# Patient Record
Sex: FEMALE | Race: BLACK OR AFRICAN AMERICAN
[De-identification: names, ages, dates, MRNs, and addresses within clinical notes are randomized per-mention and may not be internally consistent; named-entity substitution may affect disease eponyms.]

---

## 2019-06-08 ENCOUNTER — HOSPITAL ENCOUNTER (EMERGENCY)
Dept: HOSPITAL 63 - ER | Age: 29
Discharge: HOME | End: 2019-06-08
Payer: SELF-PAY

## 2019-06-08 VITALS — HEIGHT: 65 IN | WEIGHT: 125 LBS | BODY MASS INDEX: 20.83 KG/M2

## 2019-06-08 VITALS — DIASTOLIC BLOOD PRESSURE: 91 MMHG | SYSTOLIC BLOOD PRESSURE: 133 MMHG

## 2019-06-08 DIAGNOSIS — F17.200: ICD-10-CM

## 2019-06-08 DIAGNOSIS — S01.412A: Primary | ICD-10-CM
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
## 2019-06-08 PROCEDURE — 99283 EMERGENCY DEPT VISIT LOW MDM: CPT

## 2019-06-08 PROCEDURE — 12013 RPR F/E/E/N/L/M 2.6-5.0 CM: CPT

## 2019-06-08 PROCEDURE — 12014 RPR F/E/E/N/L/M 5.1-7.5 CM: CPT
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
## 2019-06-20 ENCOUNTER — HOSPITAL ENCOUNTER (EMERGENCY)
Dept: HOSPITAL 63 - ER | Age: 29
Discharge: HOME | End: 2019-06-20
Payer: SELF-PAY

## 2019-06-20 VITALS — DIASTOLIC BLOOD PRESSURE: 71 MMHG | SYSTOLIC BLOOD PRESSURE: 125 MMHG
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
## 2019-06-20 DIAGNOSIS — F17.200: ICD-10-CM

## 2019-06-20 DIAGNOSIS — S01.412D: Primary | ICD-10-CM

## 2019-06-20 PROCEDURE — 99281 EMR DPT VST MAYX REQ PHY/QHP: CPT

## 2019-06-20 PROCEDURE — 99282 EMERGENCY DEPT VISIT SF MDM: CPT

## 2019-06-20 NOTE — PHYS DOC
Past History


Past Medical History:  No Pertinent History


Past Surgical History:  No Surgical History


Smoking:  Greater than 1 pack/day


Alcohol Use:  Occasionally


Drug Use:  Amphetamine





Adult General


Chief Complaint


Chief Complaint:  SUTURE/STAPLE REMOVAL





HPI


HPI





Patient is a 28 year old female who presents to the emergency department for 

suture removal.  The patient was seen on June 8, 2019 after suffering a left-

sided facial laceration which was repaired by Dr. Bai here at [facility] 

emergency department.  The patient was instructed to follow-up in 5 days for 

removal of sutures, however she is presenting on day 12 at this time.  The 

patient has no complaints and states that the laceration has not been having any

drainage, redness, or pain.





Review of Systems


Review of Systems





Constitutional: Denies fever or chills []


HENT: Denies nasal congestion or sore throat []


Integument: Denies rash or skin lesions []


Neurologic: Denies headache, focal weakness or sensory changes []





All other systems were reviewed and found to be within normal limits, except as 

documented in this note.





Allergies


Allergies





Allergies








Coded Allergies Type Severity Reaction Last Updated Verified


 


  No Known Drug Allergies    6/17/14 No











Physical Exam


Physical Exam





Constitutional: Well developed, well nourished, no acute distress, non-toxic 

appearance. []


HENT: Normocephalic, atraumatic, bilateral external ears normal, left cheek 

laceration healed with sutures in place, oropharynx moist, no oral exudates, 

nose normal. []


Skin: Warm, dry, no erythema, no rash. [] 


Extremities: No tenderness, no cyanosis, no clubbing, ROM intact, no edema. [] 


Neurologic: Alert and oriented X 3, normal motor function, normal sensory 

function, no focal deficits noted. []





Current Patient Data


Vital Signs





                                   Vital Signs








  Date Time  Temp Pulse Resp B/P (MAP) Pulse Ox O2 Delivery O2 Flow Rate FiO2


 


6/20/19 18:32 97.3 62 18  99 Room Air  








Lab Results


Not performed





EKG


EKG


Not performed[]





Radiology/Procedures


Radiology/Procedures


Indication: Facial laceration repair on June 8, 2019





Procedure: The patient was placed in the appropriate position and the sutures 

were removed without difficult





The patient tolerated the procedure without difficulty.





Complications: None[]





Course & Med Decision Making


Course & Med Decision Making


Pertinent Labs and Imaging studies reviewed. (See chart for details)





Sutures removed in the emergency department.  Advised to clean wound edges with 

peroxide.  Recommended routine follow-up with primary doctor as needed.  Patient

 was understanding and in agreement with treatment plan.





Dragon Disclaimer


Dragon Disclaimer


This electronic medical record was generated, in whole or in part, using a voice

 recognition dictation system.





Departure


Departure:


Impression:  


   Primary Impression:  


   Visit for suture removal


Disposition:  01 HOME, SELF-CARE


Condition:  GOOD


Referrals:  


PCP,NO (PCP)


Patient Instructions:  Suture Removal-Brief





Additional Instructions:  


Follow-up with your primary care doctor as needed.  Return to the emergency 

department for any worsening symptoms.











ELODIA VEGA MD               Jun 20, 2019 19:14

## 2021-03-29 ENCOUNTER — HOSPITAL ENCOUNTER (EMERGENCY)
Dept: HOSPITAL 63 - ER | Age: 31
Discharge: HOME | End: 2021-03-29
Payer: SELF-PAY

## 2021-03-29 VITALS — HEIGHT: 65 IN | WEIGHT: 132.28 LBS | BODY MASS INDEX: 22.04 KG/M2

## 2021-03-29 VITALS — SYSTOLIC BLOOD PRESSURE: 128 MMHG | DIASTOLIC BLOOD PRESSURE: 81 MMHG

## 2021-03-29 DIAGNOSIS — F15.10: Primary | ICD-10-CM

## 2021-03-29 DIAGNOSIS — F17.200: ICD-10-CM

## 2021-03-29 DIAGNOSIS — M25.571: ICD-10-CM

## 2021-03-29 DIAGNOSIS — M54.2: ICD-10-CM

## 2021-03-29 LAB
ANION GAP SERPL CALC-SCNC: 12 MMOL/L (ref 6–14)
BASOPHILS # BLD AUTO: 0 X10^3/UL (ref 0–0.2)
BASOPHILS NFR BLD: 1 % (ref 0–3)
CA-I SERPL ISE-MCNC: 11 MG/DL (ref 7–20)
CALCIUM SERPL-MCNC: 9.4 MG/DL (ref 8.5–10.1)
CHLORIDE SERPL-SCNC: 105 MMOL/L (ref 98–107)
CO2 SERPL-SCNC: 21 MMOL/L (ref 21–32)
CREAT SERPL-MCNC: 0.8 MG/DL (ref 0.6–1)
EOSINOPHIL NFR BLD: 0 % (ref 0–3)
EOSINOPHIL NFR BLD: 0 X10^3/UL (ref 0–0.7)
ERYTHROCYTE [DISTWIDTH] IN BLOOD BY AUTOMATED COUNT: 16.2 % (ref 11.5–14.5)
GFR SERPLBLD BASED ON 1.73 SQ M-ARVRAT: 102.6 ML/MIN
GLUCOSE SERPL-MCNC: 69 MG/DL (ref 70–99)
HCT VFR BLD CALC: 45.1 % (ref 36–47)
HGB BLD-MCNC: 13.9 G/DL (ref 12–15.5)
LYMPHOCYTES # BLD: 1 X10^3/UL (ref 1–4.8)
LYMPHOCYTES NFR BLD AUTO: 17 % (ref 24–48)
MCH RBC QN AUTO: 29 PG (ref 25–35)
MCHC RBC AUTO-ENTMCNC: 31 G/DL (ref 31–37)
MCV RBC AUTO: 93 FL (ref 79–100)
MONO #: 0.4 X10^3/UL (ref 0–1.1)
MONOCYTES NFR BLD: 7 % (ref 0–9)
NEUT #: 4.2 X10^3UL (ref 1.8–7.7)
NEUTROPHILS NFR BLD AUTO: 75 % (ref 31–73)
PLATELET # BLD AUTO: 241 X10^3/UL (ref 140–400)
POTASSIUM SERPL-SCNC: 3.7 MMOL/L (ref 3.5–5.1)
RBC # BLD AUTO: 4.84 X10^6/UL (ref 3.5–5.4)
SODIUM SERPL-SCNC: 138 MMOL/L (ref 136–145)
WBC # BLD AUTO: 5.6 X10^3/UL (ref 4–11)

## 2021-03-29 PROCEDURE — 85025 COMPLETE CBC W/AUTO DIFF WBC: CPT

## 2021-03-29 PROCEDURE — 72125 CT NECK SPINE W/O DYE: CPT

## 2021-03-29 PROCEDURE — 70450 CT HEAD/BRAIN W/O DYE: CPT

## 2021-03-29 PROCEDURE — 36415 COLL VENOUS BLD VENIPUNCTURE: CPT

## 2021-03-29 PROCEDURE — 80048 BASIC METABOLIC PNL TOTAL CA: CPT

## 2021-03-29 PROCEDURE — 73610 X-RAY EXAM OF ANKLE: CPT

## 2021-03-29 PROCEDURE — 99285 EMERGENCY DEPT VISIT HI MDM: CPT
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
## 2021-03-29 NOTE — RAD
RIGHT ANKLE AP, LATERAL, OBLIQUE



Clinical Indication: Reason: ankle pain / 



Comparison: None.



Findings: 

There is no acute fracture or dislocation. 

Mineralization is normal. Joint spaces are maintained. 

The ankle mortise is intact.  There is no ankle joint effusion.  There is no radiographically apparen
t soft tissue swelling.  



IMPRESSION:

Normal ankle radiographs.



Electronically signed by: Luis Felipe Fung MD (3/29/2021 6:52 AM) [facility]

## 2021-03-29 NOTE — RAD
EXAM: CT Head without IV contrast



INDICATION: Reason: ams, neck pain? / Spl. Instructions:  / History: 



TECHNIQUE: Multi-detector row CT images were obtained of the head without the use of IV contrast. All
 CT scans performed at this facility utilize dose optimization techniques as appropriate to the exam,
 including the following: Automated exposure control and adjustment of the mA and/or KV according to 
patient size (this includes techniques or standardized protocols for targeted exams where dose is ind
ication/reason for exam).



COMPARISON: None



FINDINGS: 



BRAIN PARENCHYMA: No evidence of acute intraparenchymal hemorrhage or infarct. No abnormal parenchyma
l density or mass.



VENTRICLES & EXTRA-AXIAL SPACES:  Ventricles are within normal limits. Basilar cisterns are patent. N
o pathologic extra-axial fluid collection or mass.



ORBITS: Orbital contents are unremarkable.



SINUSES:  Visualized paranasal sinuses and mastoid air cells are clear.



OSSEOUS & SOFT TISSUES:  Calvarium and skull base are intact.



IMPRESSION:



Normal CT of the head without contrast. 









EXAM: CT Cervical Spine without IV contrast



INDICATION: Reason: ams, neck pain? / Spl. Instructions:  / History: 



TECHNIQUE:  Multi-detector row CT images were obtained through the cervical spine without the use of 
IV contrast. Post-processing sagittal and coronal reconstructed images were obtained for interpretati
on. All CT scans performed at this facility utilize dose optimization techniques as appropriate to th
e exam, including the following: Automated exposure control and adjustment of the mA and/or KV accord
ing to patient size (this includes techniques or standardized protocols for targeted exams where dose
 is indication/reason for exam).



COMPARISON: None



FINDINGS: 



CRANIOCERVICAL JUNCTION: Unremarkable. 



ALIGNMENT: Alignment is within normal limits.



OSSEOUS:  No evidence of fracture or bone destruction.



DISC SPACES:  Unremarkable.



FACET JOINTS:  Unremarkable.



SPINAL CANAL:  Unremarkable.



NEUROFORAMINA:  Unremarkable.



SOFT TISSUES:  Unremarkable.



IMPRESSION:



Normal CT of the cervical spine.



Electronically signed by: Lizzy Kothari MD (3/29/2021 8:02 AM) [id number]